# Patient Record
Sex: MALE | Race: OTHER | HISPANIC OR LATINO | URBAN - METROPOLITAN AREA
[De-identification: names, ages, dates, MRNs, and addresses within clinical notes are randomized per-mention and may not be internally consistent; named-entity substitution may affect disease eponyms.]

---

## 2024-09-09 ENCOUNTER — EMERGENCY (EMERGENCY)
Facility: HOSPITAL | Age: 24
LOS: 1 days | Discharge: HOME | End: 2024-09-09
Attending: EMERGENCY MEDICINE
Payer: SELF-PAY

## 2024-09-09 VITALS
DIASTOLIC BLOOD PRESSURE: 58 MMHG | SYSTOLIC BLOOD PRESSURE: 94 MMHG | RESPIRATION RATE: 17 BRPM | WEIGHT: 134.48 LBS | HEART RATE: 60 BPM | TEMPERATURE: 98 F | OXYGEN SATURATION: 98 %

## 2024-09-09 VITALS
OXYGEN SATURATION: 98 % | SYSTOLIC BLOOD PRESSURE: 169 MMHG | RESPIRATION RATE: 22 BRPM | DIASTOLIC BLOOD PRESSURE: 73 MMHG | TEMPERATURE: 99 F | HEART RATE: 81 BPM | WEIGHT: 130.07 LBS

## 2024-09-09 PROCEDURE — 99282 EMERGENCY DEPT VISIT SF MDM: CPT

## 2024-09-09 PROCEDURE — 99283 EMERGENCY DEPT VISIT LOW MDM: CPT

## 2024-09-09 RX ORDER — ACETAMINOPHEN WITH CODEINE 300MG-30MG
500 TABLET ORAL ONCE
Refills: 0 | Status: COMPLETED | OUTPATIENT
Start: 2024-09-09 | End: 2024-09-09

## 2024-09-09 RX ORDER — ACETAMINOPHEN WITH CODEINE 300MG-30MG
1 TABLET ORAL
Qty: 14 | Refills: 0
Start: 2024-09-09 | End: 2024-09-15

## 2024-09-09 RX ADMIN — Medication 500 MILLIGRAM(S): at 14:42

## 2024-09-09 NOTE — ED PROVIDER NOTE - PHYSICAL EXAMINATION
Right shoulder exam:  full range of motion with pain.  No clavicular or AC joint tenderness.  Pinpoint anterior rotator cuff tenderness to palpation.  Negative liftoff and belly press test.  Increased pain with empty can test.  Radial/ulnar pulses intact 2+ bilaterally.  Compartment soft and nontender.  No erythema, swelling or streaking.  Upper extremity strength intact 5/5.

## 2024-09-09 NOTE — ED ADULT NURSE NOTE - SUICIDE SCREENING DEPRESSION
Called and requested refill(s): Pt  Medications: butalbital-APAP-caffeine (FIORICET) -40 MG per tablet  Amount: 30 day supply  Pharmacy to be sent to: [facility] [place]  Call back number: [phone]  Okay to leave detailed voice message: yes     Negative

## 2024-09-09 NOTE — ED PROVIDER NOTE - OBJECTIVE STATEMENT
24-year-old male, no significant past medical history, presents for evaluation of right shoulder pain for 3 weeks.  Gradual onset of pinpoint right anterior shoulder pain.  Pain is worse when lifting arm up forward.  No pain with abduction/abduction.  No shooting pain.  Denies any recent sudden onset of pain or popping sensation in the shoulder.  No falls.  Denies any numbness, tingling or focal weakness to the area.  Yesterday pain was severe so he took Advil with partial improvement of symptoms.  Denies any chest pain, shortness of breath or any other complaints.  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

## 2024-09-09 NOTE — ED ADULT NURSE NOTE - NSFALLUNIVINTERV_ED_ALL_ED
Bed/Stretcher in lowest position, wheels locked, appropriate side rails in place/Call bell, personal items and telephone in reach/Instruct patient to call for assistance before getting out of bed/chair/stretcher/Non-slip footwear applied when patient is off stretcher/[place] to call system/Physically safe environment - no spills, clutter or unnecessary equipment/Purposeful proactive rounding/Room/bathroom lighting operational, light cord in reach

## 2024-09-09 NOTE — ED ADULT NURSE NOTE - OBJECTIVE STATEMENT
Patient presented to the ED complaining of right shoulder pain for 3 weeks. Patient denies any fall or trauma.

## 2024-09-09 NOTE — ED ADULT TRIAGE NOTE - CHIEF COMPLAINT QUOTE
R shoulder pain x 3 weeks , denies any injury .
s/p fall on Wednesday and since when  neck area , left shoulders , left side chest area pain

## 2024-09-09 NOTE — ED PROVIDER NOTE - PATIENT PORTAL LINK FT
no BM doc'd 
You can access the FollowMyHealth Patient Portal offered by [facility] by registering at the following website: [email or web address]. By joining [facility]’s FollowMyHealth portal, you will also be able to view your health information using other applications (apps) compatible with our system.

## 2024-09-09 NOTE — ED PROVIDER NOTE - ATTENDING APP SHARED VISIT CONTRIBUTION OF CARE
Seen with ACP here for right shoulder pain patient has a limitation of motion x-rays are negative will refer to orthopedics for follow-up agree with ACPs assessment history and physical and disposition

## 2024-09-09 NOTE — ED PROVIDER NOTE - NSFOLLOWUPINSTRUCTIONS_ED_ALL_ED_FT
Try your best to rest your right shoulder.    Follow-up with the primary care doctor in 7 days for reevaluation.  If your symptoms persist after 10 days or if the symptoms are getting worse you should follow-up with the orthopedist.    If you experience any new or worsening symptoms or if you are concerned you can always come back to the emergency for a re-evaluation    If there were any prescriptions given to you during the visit today take them as prescribed. If you have any questions you can ask the pharmacist.

## 2024-09-09 NOTE — ED PROVIDER NOTE - CLINICAL SUMMARY MEDICAL DECISION MAKING FREE TEXT BOX
24-year-old male, presents for evaluation of a gradual onset of right shoulder pain that is lasting for 3 weeks.  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  Exam of low suspicion for fracture or dislocation.  History and of symptoms and exam suggestive of shoulder tendinitis.  No indication for acute x-ray at this time.  Will give Rx for Naprosyn and PCP follow-up.  If symptoms do not improve in 10 days should follow-up with orthopedics.